# Patient Record
Sex: FEMALE | URBAN - METROPOLITAN AREA
[De-identification: names, ages, dates, MRNs, and addresses within clinical notes are randomized per-mention and may not be internally consistent; named-entity substitution may affect disease eponyms.]

---

## 2023-11-13 DIAGNOSIS — R00.0 SINUS TACHYCARDIA: Primary | ICD-10-CM

## 2023-11-13 RX ORDER — PROPRANOLOL HYDROCHLORIDE 10 MG/1
TABLET ORAL
COMMUNITY
Start: 2023-08-21 | End: 2023-11-13 | Stop reason: SDUPTHER

## 2023-11-13 RX ORDER — NORGESTIMATE AND ETHINYL ESTRADIOL 0.25-0.035
KIT ORAL
COMMUNITY
Start: 2019-09-09

## 2023-11-13 RX ORDER — CEFDINIR 300 MG/1
300 CAPSULE ORAL 2 TIMES DAILY
COMMUNITY
Start: 2023-11-03

## 2023-11-13 RX ORDER — PROPRANOLOL HYDROCHLORIDE 10 MG/1
TABLET ORAL
Qty: 180 TABLET | Refills: 0 | Status: SHIPPED | OUTPATIENT
Start: 2023-11-13

## 2023-12-12 ENCOUNTER — LAB REQUISITION (OUTPATIENT)
Dept: LAB | Facility: HOSPITAL | Age: 28
End: 2023-12-12

## 2023-12-12 LAB — SARS-COV-2 RNA RESP QL NAA+PROBE: DETECTED

## 2023-12-12 PROCEDURE — 87635 SARS-COV-2 COVID-19 AMP PRB: CPT

## 2024-08-20 ENCOUNTER — LAB (OUTPATIENT)
Dept: LAB | Facility: LAB | Age: 29
End: 2024-08-20
Payer: COMMERCIAL

## 2024-08-20 ENCOUNTER — APPOINTMENT (OUTPATIENT)
Dept: PRIMARY CARE | Facility: CLINIC | Age: 29
End: 2024-08-20

## 2024-08-20 VITALS
OXYGEN SATURATION: 98 % | WEIGHT: 143 LBS | RESPIRATION RATE: 21 BRPM | HEART RATE: 90 BPM | DIASTOLIC BLOOD PRESSURE: 84 MMHG | SYSTOLIC BLOOD PRESSURE: 124 MMHG | BODY MASS INDEX: 22.98 KG/M2 | HEIGHT: 66 IN

## 2024-08-20 DIAGNOSIS — G90.A POTS (POSTURAL ORTHOSTATIC TACHYCARDIA SYNDROME): Primary | ICD-10-CM

## 2024-08-20 DIAGNOSIS — R00.0 SINUS TACHYCARDIA: ICD-10-CM

## 2024-08-20 DIAGNOSIS — G90.A POTS (POSTURAL ORTHOSTATIC TACHYCARDIA SYNDROME): ICD-10-CM

## 2024-08-20 DIAGNOSIS — R03.0 ELEVATED BP WITHOUT DIAGNOSIS OF HYPERTENSION: ICD-10-CM

## 2024-08-20 LAB
ALBUMIN SERPL BCP-MCNC: 4.2 G/DL (ref 3.4–5)
ALP SERPL-CCNC: 53 U/L (ref 33–110)
ALT SERPL W P-5'-P-CCNC: 20 U/L (ref 7–45)
ANION GAP SERPL CALC-SCNC: 14 MMOL/L (ref 10–20)
AST SERPL W P-5'-P-CCNC: 17 U/L (ref 9–39)
BASOPHILS # BLD AUTO: 0.06 X10*3/UL (ref 0–0.1)
BASOPHILS NFR BLD AUTO: 0.6 %
BILIRUB SERPL-MCNC: 0.5 MG/DL (ref 0–1.2)
BUN SERPL-MCNC: 9 MG/DL (ref 6–23)
CALCIUM SERPL-MCNC: 9.8 MG/DL (ref 8.6–10.3)
CHLORIDE SERPL-SCNC: 102 MMOL/L (ref 98–107)
CHOLEST SERPL-MCNC: 161 MG/DL (ref 0–199)
CHOLESTEROL/HDL RATIO: 2.3
CO2 SERPL-SCNC: 26 MMOL/L (ref 21–32)
CREAT SERPL-MCNC: 0.9 MG/DL (ref 0.5–1.05)
EGFRCR SERPLBLD CKD-EPI 2021: 89 ML/MIN/1.73M*2
EOSINOPHIL # BLD AUTO: 0.04 X10*3/UL (ref 0–0.7)
EOSINOPHIL NFR BLD AUTO: 0.4 %
ERYTHROCYTE [DISTWIDTH] IN BLOOD BY AUTOMATED COUNT: 12.6 % (ref 11.5–14.5)
GLUCOSE SERPL-MCNC: 77 MG/DL (ref 74–99)
HCT VFR BLD AUTO: 45.2 % (ref 36–46)
HDLC SERPL-MCNC: 68.7 MG/DL
HGB BLD-MCNC: 14.5 G/DL (ref 12–16)
IMM GRANULOCYTES # BLD AUTO: 0.05 X10*3/UL (ref 0–0.7)
IMM GRANULOCYTES NFR BLD AUTO: 0.5 % (ref 0–0.9)
LDLC SERPL CALC-MCNC: 83 MG/DL
LYMPHOCYTES # BLD AUTO: 1.54 X10*3/UL (ref 1.2–4.8)
LYMPHOCYTES NFR BLD AUTO: 14.8 %
MAGNESIUM SERPL-MCNC: 2 MG/DL (ref 1.6–2.4)
MCH RBC QN AUTO: 28.5 PG (ref 26–34)
MCHC RBC AUTO-ENTMCNC: 32.1 G/DL (ref 32–36)
MCV RBC AUTO: 89 FL (ref 80–100)
MONOCYTES # BLD AUTO: 0.54 X10*3/UL (ref 0.1–1)
MONOCYTES NFR BLD AUTO: 5.2 %
NEUTROPHILS # BLD AUTO: 8.18 X10*3/UL (ref 1.2–7.7)
NEUTROPHILS NFR BLD AUTO: 78.5 %
NON HDL CHOLESTEROL: 92 MG/DL (ref 0–149)
NRBC BLD-RTO: 0 /100 WBCS (ref 0–0)
PLATELET # BLD AUTO: 414 X10*3/UL (ref 150–450)
POTASSIUM SERPL-SCNC: 4.5 MMOL/L (ref 3.5–5.3)
PROT SERPL-MCNC: 7.2 G/DL (ref 6.4–8.2)
RBC # BLD AUTO: 5.08 X10*6/UL (ref 4–5.2)
SODIUM SERPL-SCNC: 137 MMOL/L (ref 136–145)
TRIGL SERPL-MCNC: 48 MG/DL (ref 0–149)
TSH SERPL-ACNC: 1.31 MIU/L (ref 0.44–3.98)
VLDL: 10 MG/DL (ref 0–40)
WBC # BLD AUTO: 10.4 X10*3/UL (ref 4.4–11.3)

## 2024-08-20 PROCEDURE — 80061 LIPID PANEL: CPT

## 2024-08-20 PROCEDURE — 80053 COMPREHEN METABOLIC PANEL: CPT

## 2024-08-20 PROCEDURE — 93000 ELECTROCARDIOGRAM COMPLETE: CPT

## 2024-08-20 PROCEDURE — 83735 ASSAY OF MAGNESIUM: CPT

## 2024-08-20 PROCEDURE — 84443 ASSAY THYROID STIM HORMONE: CPT

## 2024-08-20 PROCEDURE — 85025 COMPLETE CBC W/AUTO DIFF WBC: CPT

## 2024-08-20 PROCEDURE — 36415 COLL VENOUS BLD VENIPUNCTURE: CPT

## 2024-08-20 ASSESSMENT — ENCOUNTER SYMPTOMS
DEPRESSION: 0
LOSS OF SENSATION IN FEET: 0
OCCASIONAL FEELINGS OF UNSTEADINESS: 0

## 2024-08-20 ASSESSMENT — PATIENT HEALTH QUESTIONNAIRE - PHQ9
1. LITTLE INTEREST OR PLEASURE IN DOING THINGS: NOT AT ALL
SUM OF ALL RESPONSES TO PHQ9 QUESTIONS 1 AND 2: 0
2. FEELING DOWN, DEPRESSED OR HOPELESS: NOT AT ALL

## 2024-08-20 NOTE — PROGRESS NOTES
Primary Care Provider: ELIJAH Bronson-CNP    Subjective   Yaneli Olea is a 28 y.o. female who presents for Office Visit (Bp has been running high ).    HPI     POTS  on propranolol 5mg daily   Continues to have occasional , about 5-6 times palpitations & HR racing in a year; this has greatly improved on the propanolol; previously was having symptoms weekly  Having elevated -160/'s - using small BP cuff  Patient not complaining of headaches, dizziness, numbness, tingling, weakness, confusion, nausea, vomiting, or abdominal pain.        Review of Systems   Constitutional: Negative.  Negative for activity change, appetite change, chills, diaphoresis, fatigue, fever and unexpected weight change.   HENT: Negative.  Negative for congestion, dental problem, ear discharge, ear pain, hearing loss, mouth sores, nosebleeds, postnasal drip, rhinorrhea, sinus pressure, sneezing, sore throat, tinnitus, trouble swallowing and voice change.    Eyes: Negative.  Negative for photophobia, discharge and visual disturbance.   Respiratory: Negative.  Negative for apnea, cough, chest tightness, shortness of breath, wheezing and stridor.    Cardiovascular:  Negative for chest pain and leg swelling.   Gastrointestinal: Negative.  Negative for abdominal distention, abdominal pain, anal bleeding, blood in stool, constipation, diarrhea, nausea and rectal pain.   Endocrine: Negative.  Negative for cold intolerance, heat intolerance, polydipsia, polyphagia and polyuria.   Genitourinary: Negative.  Negative for decreased urine volume, difficulty urinating, dysuria, flank pain, frequency, hematuria and urgency.   Musculoskeletal: Negative.  Negative for back pain, gait problem, joint swelling, myalgias, neck pain and neck stiffness.   Skin: Negative.  Negative for color change and rash.   Neurological: Negative.  Negative for dizziness, tremors, seizures, syncope, speech difficulty, weakness, light-headedness, numbness  "and headaches.   Hematological: Negative.  Does not bruise/bleed easily.   Psychiatric/Behavioral: Negative.  Negative for agitation, confusion, dysphoric mood, sleep disturbance and suicidal ideas. The patient is not nervous/anxious and is not hyperactive.    All other systems reviewed and are negative.        Objective   /84 (BP Location: Left arm, Patient Position: Sitting, BP Cuff Size: Large adult)   Pulse 90   Resp 21   Ht 1.676 m (5' 6\")   Wt 64.9 kg (143 lb)   SpO2 98%   BMI 23.08 kg/m²     Physical Exam  Vitals reviewed.   Constitutional:       General: She is not in acute distress.     Appearance: Normal appearance. She is normal weight. She is not ill-appearing, toxic-appearing or diaphoretic.   HENT:      Head: Normocephalic and atraumatic.      Nose: Nose normal.   Eyes:      Conjunctiva/sclera: Conjunctivae normal.   Cardiovascular:      Rate and Rhythm: Normal rate and regular rhythm.      Pulses: Normal pulses.      Heart sounds: Normal heart sounds. No murmur heard.     No friction rub. No gallop.   Pulmonary:      Effort: Pulmonary effort is normal. No respiratory distress.      Breath sounds: Normal breath sounds.   Abdominal:      General: Abdomen is flat. Bowel sounds are normal.      Palpations: Abdomen is soft.   Musculoskeletal:         General: Normal range of motion.      Cervical back: Normal range of motion and neck supple.   Lymphadenopathy:      Cervical: No cervical adenopathy.   Skin:     General: Skin is warm and dry.      Capillary Refill: Capillary refill takes less than 2 seconds.   Neurological:      General: No focal deficit present.      Mental Status: She is alert and oriented to person, place, and time. Mental status is at baseline.   Psychiatric:         Mood and Affect: Mood normal.         Behavior: Behavior normal.         Thought Content: Thought content normal.         Judgment: Judgment normal.         Assessment/Plan   Problem List Items Addressed This " Visit             ICD-10-CM    Sinus tachycardia R00.0    Improving  C/w propanolol  Relevant Orders    Magnesium (Completed)    TSH with reflex to Free T4 if abnormal (Completed)    Comprehensive metabolic panel (Completed)    CBC and Auto Differential (Completed)    Lipid Panel (Completed)    ECG 12 lead (Clinic Performed) (Completed)    POTS (postural orthostatic tachycardia syndrome) - Primary G90.A    Improving  C/w propanolol   Relevant Orders    Magnesium (Completed)    TSH with reflex to Free T4 if abnormal (Completed)    Comprehensive metabolic panel (Completed)    CBC and Auto Differential (Completed)    Lipid Panel (Completed)    ECG 12 lead (Clinic Performed) (Completed)    Elevated BP without diagnosis of hypertension R03.0    Stable, BP was good today in office  Patient to check BP with adult large cuff; small cuff is too small for the diameter of her arm  Sit for 5 mins before checking BP  C/w propanolol  Discuss switching to nifedipine or labetalol if BP becomes elevated  Low sodium diet  Relevant Orders    Magnesium (Completed)    TSH with reflex to Free T4 if abnormal (Completed)    Comprehensive metabolic panel (Completed)    CBC and Auto Differential (Completed)    Lipid Panel (Completed)    ECG 12 lead (Clinic Performed) (Completed)   Overall blood work looked good      Follow up if new, worsening, or persisting symptoms occur; follow up in 3-4 months or sooner if needed

## 2024-08-24 PROBLEM — R03.0 ELEVATED BP WITHOUT DIAGNOSIS OF HYPERTENSION: Status: ACTIVE | Noted: 2024-08-24

## 2024-08-24 ASSESSMENT — ENCOUNTER SYMPTOMS
TROUBLE SWALLOWING: 0
ENDOCRINE NEGATIVE: 1
CHILLS: 0
CHEST TIGHTNESS: 0
NECK PAIN: 0
DYSURIA: 0
CONSTITUTIONAL NEGATIVE: 1
NERVOUS/ANXIOUS: 0
POLYDIPSIA: 0
FLANK PAIN: 0
MYALGIAS: 0
WEAKNESS: 0
EYES NEGATIVE: 1
GASTROINTESTINAL NEGATIVE: 1
ABDOMINAL DISTENTION: 0
ABDOMINAL PAIN: 0
CONFUSION: 0
SORE THROAT: 0
FREQUENCY: 0
DIAPHORESIS: 0
RECTAL PAIN: 0
LIGHT-HEADEDNESS: 0
BRUISES/BLEEDS EASILY: 0
SEIZURES: 0
PHOTOPHOBIA: 0
EYE DISCHARGE: 0
COLOR CHANGE: 0
PSYCHIATRIC NEGATIVE: 1
ANAL BLEEDING: 0
SPEECH DIFFICULTY: 0
APNEA: 0
RESPIRATORY NEGATIVE: 1
TREMORS: 0
RHINORRHEA: 0
WHEEZING: 0
MUSCULOSKELETAL NEGATIVE: 1
FATIGUE: 0
FEVER: 0
CONSTIPATION: 0
HEMATOLOGIC/LYMPHATIC NEGATIVE: 1
HEADACHES: 0
ACTIVITY CHANGE: 0
NUMBNESS: 0
AGITATION: 0
SHORTNESS OF BREATH: 0
HYPERACTIVE: 0
NECK STIFFNESS: 0
APPETITE CHANGE: 0
BLOOD IN STOOL: 0
NAUSEA: 0
DIFFICULTY URINATING: 0
COUGH: 0
POLYPHAGIA: 0
DIARRHEA: 0
DIZZINESS: 0
SLEEP DISTURBANCE: 0
SINUS PRESSURE: 0
JOINT SWELLING: 0
STRIDOR: 0
HEMATURIA: 0
VOICE CHANGE: 0
BACK PAIN: 0
UNEXPECTED WEIGHT CHANGE: 0
NEUROLOGICAL NEGATIVE: 1
DYSPHORIC MOOD: 0

## 2024-09-04 DIAGNOSIS — R00.0 SINUS TACHYCARDIA: ICD-10-CM

## 2024-09-06 RX ORDER — PROPRANOLOL HYDROCHLORIDE 10 MG/1
TABLET ORAL
Qty: 60 TABLET | Refills: 0 | Status: SHIPPED | OUTPATIENT
Start: 2024-09-06

## 2024-11-18 ENCOUNTER — APPOINTMENT (OUTPATIENT)
Dept: PRIMARY CARE | Facility: CLINIC | Age: 29
End: 2024-11-18
Payer: COMMERCIAL

## 2024-12-12 DIAGNOSIS — R00.0 SINUS TACHYCARDIA: ICD-10-CM

## 2024-12-13 RX ORDER — PROPRANOLOL HYDROCHLORIDE 10 MG/1
TABLET ORAL
Qty: 60 TABLET | Refills: 0 | Status: SHIPPED | OUTPATIENT
Start: 2024-12-13

## 2025-03-03 ENCOUNTER — APPOINTMENT (OUTPATIENT)
Dept: PRIMARY CARE | Facility: CLINIC | Age: 30
End: 2025-03-03
Payer: COMMERCIAL

## 2025-03-03 VITALS
HEART RATE: 89 BPM | BODY MASS INDEX: 23.14 KG/M2 | DIASTOLIC BLOOD PRESSURE: 80 MMHG | HEIGHT: 66 IN | OXYGEN SATURATION: 97 % | SYSTOLIC BLOOD PRESSURE: 126 MMHG | WEIGHT: 144 LBS

## 2025-03-03 DIAGNOSIS — G90.A POSTURAL ORTHOSTATIC TACHYCARDIA SYNDROME: ICD-10-CM

## 2025-03-03 DIAGNOSIS — Z00.00 HEALTHCARE MAINTENANCE: Primary | ICD-10-CM

## 2025-03-03 DIAGNOSIS — R00.0 SINUS TACHYCARDIA: ICD-10-CM

## 2025-03-03 DIAGNOSIS — R03.0 ELEVATED BP WITHOUT DIAGNOSIS OF HYPERTENSION: ICD-10-CM

## 2025-03-03 PROBLEM — R06.09 DYSPNEA ON EXERTION: Status: RESOLVED | Noted: 2025-03-03 | Resolved: 2025-03-03

## 2025-03-03 PROBLEM — I10 HYPERTENSION: Status: RESOLVED | Noted: 2025-03-03 | Resolved: 2025-03-03

## 2025-03-03 PROBLEM — Z86.2 HISTORY OF ANEMIA: Status: RESOLVED | Noted: 2025-03-03 | Resolved: 2025-03-03

## 2025-03-03 PROCEDURE — 99395 PREV VISIT EST AGE 18-39: CPT

## 2025-03-03 PROCEDURE — 3008F BODY MASS INDEX DOCD: CPT

## 2025-03-03 PROCEDURE — 1036F TOBACCO NON-USER: CPT

## 2025-03-03 RX ORDER — PROPRANOLOL HYDROCHLORIDE 10 MG/1
TABLET ORAL
Qty: 60 TABLET | Refills: 2 | Status: SHIPPED | OUTPATIENT
Start: 2025-03-03

## 2025-03-03 ASSESSMENT — PATIENT HEALTH QUESTIONNAIRE - PHQ9
1. LITTLE INTEREST OR PLEASURE IN DOING THINGS: NOT AT ALL
2. FEELING DOWN, DEPRESSED OR HOPELESS: NOT AT ALL
2. FEELING DOWN, DEPRESSED OR HOPELESS: NOT AT ALL
SUM OF ALL RESPONSES TO PHQ9 QUESTIONS 1 AND 2: 0
1. LITTLE INTEREST OR PLEASURE IN DOING THINGS: NOT AT ALL
SUM OF ALL RESPONSES TO PHQ9 QUESTIONS 1 AND 2: 0

## 2025-03-03 ASSESSMENT — ENCOUNTER SYMPTOMS
OCCASIONAL FEELINGS OF UNSTEADINESS: 0
LOSS OF SENSATION IN FEET: 0
DEPRESSION: 0

## 2025-03-03 NOTE — PROGRESS NOTES
"Reason for Visit: Annual Physical Exam    HPI:  HPI    Health Maintenance    POTS  on propranolol 5mg daily   Elevated BP  No compression stocking use  Patient not complaining of headaches, dizziness, numbness, tingling, weakness, confusion, nausea, vomiting, or abdominal pain.    Getting  in [place] sept 2025    HM:  Mammogram: due at age 39 unless otherwise indicated  PAP test: UTD; last 2019  Colonoscopy: due at age 44 unless otherwise indicated  TDAP: declines/ refuses  Influenza vaccine: declines/ refuses  Diet: well-balanced; cooks most her meals  Exercise: none  Eye Examinations: no vision issues  Dental Examinations: due  Lung cancer screening: never smoker  Hgb A1c: No results found for: \"HGBA1C\"  Cholesterol panel:   Lab Results   Component Value Date    CHOL 161 08/20/2024     Lab Results   Component Value Date    HDL 68.7 08/20/2024      Lab Results   Component Value Date    LDLCALC 83 08/20/2024     Lab Results   Component Value Date    TRIG 48 08/20/2024     No components found for: \"CHOLHDL\"        Active Problem List  Patient Active Problem List   Diagnosis    Sinus tachycardia    Mitral valve prolapse    Postural orthostatic tachycardia syndrome    Elevated BP without diagnosis of hypertension    Healthcare maintenance       Comprehensive Medical/Surgical/Social/Family History  Past Medical History:   Diagnosis Date    Dyspnea on exertion 03/03/2025    History of anemia 03/03/2025    Hypertension 03/03/2025    Personal history of diseases of the blood and blood-forming organs and certain disorders involving the immune mechanism     History of anemia    Personal history of other drug therapy     History of varicella vaccination    Personal history of other drug therapy     History of vaccination against human papillomavirus    Personal history of other endocrine, nutritional and metabolic disease 05/18/2018    History of vitamin D deficiency    Postural orthostatic tachycardia syndrome (POTS) " "12/29/2022    POTS (postural orthostatic tachycardia syndrome)     Past Surgical History:   Procedure Laterality Date    OTHER SURGICAL HISTORY  09/09/2019    Tonsillectomy with adenoidectomy    OTHER SURGICAL HISTORY  09/09/2019    Oral surgery     Social History     Social History Narrative    Not on file         Allergies and Medications  Patient has no known allergies.  Current Outpatient Medications on File Prior to Visit   Medication Sig Dispense Refill    norgestimate-ethinyl estradioL (Sprintec, 28,) 0.25-35 mg-mcg tablet Take by mouth.      [DISCONTINUED] propranolol (Inderal) 10 mg tablet TAKE 1 TABLET BY MOUTH UP TO TWICE DAILY AS NEEDED 60 tablet 0     No current facility-administered medications on file prior to visit.         ROS otherwise negative aside from what was mentioned above in HPI.  Review of Systems      Vitals  /80   Pulse 89   Ht 1.676 m (5' 6\")   Wt 65.3 kg (144 lb)   SpO2 97%   BMI 23.24 kg/m²   Body mass index is 23.24 kg/m².    Physical Exam  Physical Exam  Vitals reviewed.   Constitutional:       General: She is not in acute distress.     Appearance: Normal appearance. She is normal weight. She is not ill-appearing, toxic-appearing or diaphoretic.   HENT:      Head: Normocephalic and atraumatic.      Right Ear: Tympanic membrane, ear canal and external ear normal. There is no impacted cerumen.      Left Ear: Tympanic membrane, ear canal and external ear normal. There is no impacted cerumen.      Nose: Nose normal.   Eyes:      Conjunctiva/sclera: Conjunctivae normal.   Cardiovascular:      Rate and Rhythm: Normal rate and regular rhythm.      Pulses: Normal pulses.      Heart sounds: Normal heart sounds. No murmur heard.     No friction rub. No gallop.   Pulmonary:      Effort: Pulmonary effort is normal. No respiratory distress.      Breath sounds: Normal breath sounds.   Abdominal:      General: Abdomen is flat. Bowel sounds are normal.      Palpations: Abdomen is soft. "   Musculoskeletal:      Cervical back: Normal range of motion and neck supple.   Lymphadenopathy:      Cervical: No cervical adenopathy.   Skin:     General: Skin is warm and dry.   Neurological:      General: No focal deficit present.      Mental Status: She is alert and oriented to person, place, and time. Mental status is at baseline.   Psychiatric:         Mood and Affect: Mood normal.         Behavior: Behavior normal.         Thought Content: Thought content normal.         Judgment: Judgment normal.           Assessment and Plan:  Problem List Items Addressed This Visit             ICD-10-CM    Sinus tachycardia R00.0    Stable; start regular physical activity  C/w good water intake  Start Compression stocking use as needed  Relevant Medications    propranolol (Inderal) 10 mg tablet    Postural orthostatic tachycardia syndrome G90.A    Stable; start regular physical activity  C/w good water intake  Start Compression stocking use as needed  Relevant Medications    propranolol (Inderal) 10 mg tablet    Elevated BP without diagnosis of hypertension  Stable; start regular physical activity R03.0    Healthcare maintenance - Primary Z00.00    Relevant Orders    Referral to Obstetrics / Gynecology       HM:  Mammogram: due at age 39 unless otherwise indicated  PAP test: UTD; last 2019  Colonoscopy: due at age 44 unless otherwise indicated  TDAP: declines/ refuses  Influenza vaccine: declines/ refuses  Diet: well-balanced; cooks most her meals  Exercise: none  Eye Examinations: no vision issues  Dental Examinations: due  Lung cancer screening: never smoker  Encourage diet and exercise to maintain healthy lifestyle.     Follow up with yearly wellness exam and as needed    ELIJAH Bronson-CNP